# Patient Record
Sex: MALE | Race: ASIAN
[De-identification: names, ages, dates, MRNs, and addresses within clinical notes are randomized per-mention and may not be internally consistent; named-entity substitution may affect disease eponyms.]

---

## 2019-02-27 ENCOUNTER — HOSPITAL ENCOUNTER (EMERGENCY)
Dept: HOSPITAL 10 - E/R | Age: 44
LOS: 1 days | Discharge: HOME | End: 2019-02-28
Payer: COMMERCIAL

## 2019-02-27 ENCOUNTER — HOSPITAL ENCOUNTER (EMERGENCY)
Dept: HOSPITAL 91 - E/R | Age: 44
LOS: 1 days | Discharge: HOME | End: 2019-02-28
Payer: COMMERCIAL

## 2019-02-27 VITALS
WEIGHT: 170.35 LBS | BODY MASS INDEX: 25.23 KG/M2 | HEIGHT: 69 IN | HEIGHT: 69 IN | BODY MASS INDEX: 25.23 KG/M2 | WEIGHT: 170.35 LBS

## 2019-02-27 DIAGNOSIS — D64.9: ICD-10-CM

## 2019-02-27 DIAGNOSIS — L03.116: Primary | ICD-10-CM

## 2019-02-27 PROCEDURE — 73630 X-RAY EXAM OF FOOT: CPT

## 2019-02-27 PROCEDURE — 96375 TX/PRO/DX INJ NEW DRUG ADDON: CPT

## 2019-02-27 PROCEDURE — 85651 RBC SED RATE NONAUTOMATED: CPT

## 2019-02-27 PROCEDURE — 84560 ASSAY OF URINE/URIC ACID: CPT

## 2019-02-27 PROCEDURE — 99285 EMERGENCY DEPT VISIT HI MDM: CPT

## 2019-02-27 PROCEDURE — 96374 THER/PROPH/DIAG INJ IV PUSH: CPT

## 2019-02-27 PROCEDURE — 80048 BASIC METABOLIC PNL TOTAL CA: CPT

## 2019-02-27 PROCEDURE — 93971 EXTREMITY STUDY: CPT

## 2019-02-27 PROCEDURE — 85025 COMPLETE CBC W/AUTO DIFF WBC: CPT

## 2019-02-28 VITALS — RESPIRATION RATE: 16 BRPM | SYSTOLIC BLOOD PRESSURE: 133 MMHG | HEART RATE: 68 BPM | DIASTOLIC BLOOD PRESSURE: 87 MMHG

## 2019-02-28 LAB
ADD MAN DIFF?: NO
ANION GAP: 13 (ref 5–13)
BASOPHIL #: 0 10^3/UL (ref 0–0.1)
BASOPHILS %: 0.3 % (ref 0–2)
BLOOD UREA NITROGEN: 14 MG/DL (ref 7–20)
CALCIUM: 9.9 MG/DL (ref 8.4–10.2)
CARBON DIOXIDE: 23 MMOL/L (ref 21–31)
CHLORIDE: 104 MMOL/L (ref 97–110)
CREATININE: 1.22 MG/DL (ref 0.61–1.24)
EOSINOPHILS #: 0.4 10^3/UL (ref 0–0.5)
EOSINOPHILS %: 3.9 % (ref 0–7)
ERYTHROCYTE SEDIMENTATION RATE: 72 MM/HR (ref 0–15)
GLUCOSE: 102 MG/DL (ref 70–220)
HEMATOCRIT: 40.1 % (ref 42–52)
HEMOGLOBIN: 13.1 G/DL (ref 14–18)
IMMATURE GRANS #M: 0.06 10^3/UL (ref 0–0.03)
IMMATURE GRANS % (M): 0.6 % (ref 0–0.43)
LYMPHOCYTES #: 3.1 10^3/UL (ref 0.8–2.9)
LYMPHOCYTES %: 29.7 % (ref 15–51)
MEAN CORPUSCULAR HEMOGLOBIN: 32.7 PG (ref 29–33)
MEAN CORPUSCULAR HGB CONC: 32.7 G/DL (ref 32–37)
MEAN CORPUSCULAR VOLUME: 100 FL (ref 82–101)
MEAN PLATELET VOLUME: 9 FL (ref 7.4–10.4)
MONOCYTE #: 0.8 10^3/UL (ref 0.3–0.9)
MONOCYTES %: 7.5 % (ref 0–11)
NEUTROPHIL #: 6 10^3/UL (ref 1.6–7.5)
NEUTROPHILS %: 58 % (ref 39–77)
NUCLEATED RED BLOOD CELLS #: 0 10^3/UL (ref 0–0)
NUCLEATED RED BLOOD CELLS%: 0 /100WBC (ref 0–0)
PLATELET COUNT: 249 10^3/UL (ref 140–415)
POTASSIUM: 4 MMOL/L (ref 3.5–5.1)
RED BLOOD COUNT: 4.01 10^6/UL (ref 4.7–6.1)
RED CELL DISTRIBUTION WIDTH: 11.5 % (ref 11.5–14.5)
SODIUM: 140 MMOL/L (ref 135–144)
URIC ACID: 7.8 MG/DL (ref 3.1–7.9)
WHITE BLOOD COUNT: 10.3 10^3/UL (ref 4.8–10.8)

## 2019-02-28 RX ADMIN — KETOROLAC TROMETHAMINE 1 MG: 30 INJECTION, SOLUTION INTRAMUSCULAR at 00:35

## 2019-02-28 RX ADMIN — CEFAZOLIN 1 MLS/HR: 1 INJECTION, POWDER, FOR SOLUTION INTRAMUSCULAR; INTRAVENOUS at 02:31

## 2019-02-28 NOTE — ERD
ER Documentation


Chief Complaint


Chief Complaint





LEFT FOOT X 2 WEEKS





HPI


The patient is a 44-year-old male, presenting to the ER because of acute left 


foot pain/swelling intermittently for the last 2 weeks, worse today.  He denies 


fever, chills, neck pain, chest pain, dyspnea, abdominal pain, vomiting, 


dysuria, diarrhea.  He smokes and drinks socially, denies any recent of 


traveling





Medical history: Gout


Past surgical history: Appendectomy





ROS


All systems reviewed and are negative except as per history of present illness.





Medications


Home Meds


Active Scripts


Ibuprofen* (Motrin*) 600 Mg Tab, 600 MG PO Q6H PRN for PAIN AND OR ELEVATED 


TEMP, #30 TAB


   Prov:ROSEMARY YUSUF MD         19


Hydrocodone/Acetaminophen (Norco 5-325 Tablet) 1 Each Tablet, 1 TAB PO Q6H PRN 


for PAIN, #7 TAB


   Prov:ROSEMARY YUSUF MD         19


Cephalexin* (Keflex*) 500 Mg Capsule, 500 MG PO QID for 10 Days, CAP


   Prov:ROSEMARY YUSUF MD         19





Allergies


Allergies:  


Coded Allergies:  


     No Known Allergy (Unverified , 19)





Physical Exam


Vitals





Vital Signs


  Date      Temp  Pulse  Resp  B/P (MAP)   Pulse Ox  O2          O2 Flow    FiO2


Time                                                 Delivery    Rate


   19           68    16      133/87        98  Room Air


     00:55                          (102)


   19  98.1    105    18      160/93        97


     20:40                          (115)





Physical Exam


 Const:      No acute distress.


 Head:        Atraumatic.


 Eyes:       Normal Conjunctiva.


 ENT:         Normal External Ears, Nose and Mouth.


 Neck:        Full range of motion.  No meningismus.


 Resp:         Clear to auscultation bilaterally.


 Cardio:       Regular rate and rhythm.


 Abd:         Soft,  non distended, normal bowel sounds, non tender.


 Skin:         No petechiae or rashes.


 Back:        No midline or flank tenderness.


 Ext:          Left foot is edematous, erythematous, warm to touch, vague left 


calf tenderness, minimal discomfort at the first metatarsophalangeal joint


 Neur:        Awake and alert. No focal deficit


 Psych:        Normal Mood and Affect.


Result Diagram:  


190                                                                    


           19 003





Results 24 hrs





Laboratory Tests


              Test
                                 19
00:30


              White Blood Count                     10.3 10^3/ul


              Red Blood Count                       4.01 10^6/ul


              Hemoglobin                               13.1 g/dl


              Hematocrit                                  40.1 %


              Mean Corpuscular Volume                   100.0 fl


              Mean Corpuscular Hemoglobin                32.7 pg


              Mean Corpuscular Hemoglobin
Concent     32.7 g/dl 



              Red Cell Distribution Width                 11.5 %


              Platelet Count                         249 10^3/UL


              Mean Platelet Volume                        9.0 fl


              Immature Granulocytes %                    0.600 %


              Neutrophils %                               58.0 %


              Lymphocytes %                               29.7 %


              Monocytes %                                  7.5 %


              Eosinophils %                                3.9 %


              Basophils %                                  0.3 %


              Nucleated Red Blood Cells %            0.0 /100WBC


              Immature Granulocytes #              0.060 10^3/ul


              Neutrophils #                          6.0 10^3/ul


              Lymphocytes #                          3.1 10^3/ul


              Monocytes #                            0.8 10^3/ul


              Eosinophils #                          0.4 10^3/ul


              Basophils #                            0.0 10^3/ul


              Nucleated Red Blood Cells #            0.0 10^3/ul


              Erythrocyte Sedimentation Rate            72 mm/Hr


              Sodium Level                            140 mmol/L


              Potassium Level                         4.0 mmol/L


              Chloride Level                          104 mmol/L


              Carbon Dioxide Level                     23 mmol/L


              Anion Gap                                       13


              Blood Urea Nitrogen                       14 mg/dl


              Creatinine                              1.22 mg/dl


              Est Glomerular Filtrat Rate
mL/min   > 60 mL/min 



              Glucose Level                            102 mg/dl


              Uric Acid                                7.8 mg/dl


              Calcium Level                            9.9 mg/dl





Current Medications


 Medications
   Dose
          Sig/Lynn
       Start Time
   Status  Last


 (Trade)       Ordered        Route
 PRN     Stop Time              Admin
Dose


                              Reason                                Admin


 Ketorolac
     30 mg          ONCE  STAT
    19       DC           19


Tromethamine
                 IV
            00:13
                       00:35



 (Toradol)                                   19 00:19


 Calcium        1,000 mg       ONCE  ONCE
    19       Cancel   



Chloride
                     IV
            01:30



(Ca Chloride                                 19 01:31


10%
 Syg)


 Cefazolin      50 ml @ 
      ONCE
 IVPB
    19                



Sodium         100 mls/hr                    02:30



                                             19 02:59








Procedures/[place] [address]


                        Radiology Main Line: [phone]





                            DIAGNOSTIC IMAGING REPORT





  Patient: PATY MARTINEZ   : 1975   Age: 44  Sex: M                        


       MR #:    [id number]   Acct #:   [id number]    DOS: 19 0013


Ordering MD: ROSEMARY YUSUF MD   Location:  E/R   Room/Bed:                    


                       


                                        


PROCEDURE:   US left Lower extremity Venous. 


 


CLINICAL INDICATION: Left lower extremity pain.


 


TECHNIQUE:   Multiple sonographic images of the left lower extremity deep venous


system was obtained utilizing gray scale, color-flow, compressive sonography and


doppler imaging with augmentation.  The images were reviewed on a PACS 


workstation. 


 


COMPARISON:   None. 


 


FINDINGS:


There is normal compressibility and flow within the left common femoral, deep 


femoral, superficial femoral, posterior tibial, peroneal and popliteal veins.  


 


IMPRESSION:


No sonographic evidence for left lower extremity deep venous thrombosis. 


 


RPTAT: UU


_____________________________________________ 


CHRISTINA Chavez Physician           Date    Time 


Electronically viewed and signed by Physician Rachna on 2019 01:53 


 


D:  2019 01:53  T:  2019 01:53


RS/





CC: ROSEMARY YUSUF MD





[id number]


                          [place] [address]


                        Radiology Main Line: [phone]





                            DIAGNOSTIC IMAGING REPORT





  Patient: PATY MARTINEZ   : 1975   Age: 44  Sex: M                        


       MR #:    [id number]   Acct #:   [id number]    DOS: 19 0013


Ordering MD: ROSEMARY YUSUF MD   Location:  E/R   Room/Bed:                    


                       


                                        


PROCEDURE:  X-ray left foot.


 


CLINICAL INDICATION:   Pain and swelling in the left foot. Reference markers are


directed towards the lateral and dorsum of the midfoot.


 


TECHNIQUE:   AP, lateral and oblique views of the left foot. 


 


COMPARISON:   None. 


 


FINDINGS:


No acute fracture or dislocation. Small plantar and posterior dorsal calcaneal 


enthesophytes. Mild soft tissue swelling over the dorsum of the left foot. 


Otherwise, the soft tissues are unremarkable. 


 


IMPRESSION:


No acute fracture.


 


RPTAT: UU


_____________________________________________ 


R  Stecher, Physician           Date    Time 


Electronically viewed and signed by Physician Rachna on 2019 01:52 


 


D:  2019 01:52  T:  2019 01:52


RS/





CC: ROSEMARY YUSUF MD





[id number]





MEDICAL MAKING DECISION: The patient is a 44-year-old male, presenting with 


acute left foot cellulitis, suspected with acute gout flareup.  He was treated 


with Toradol 30 mg IV for pain, Ancef 1 g IV for cellulitis with good response, 


is above outpatient follow-up





The differential diagnoses considered include but are not limited to cellulitis,


abscess, acute gout, DVT, lymphedema





Departure


Diagnosis:  


   Primary Impression:  


   Cellulitis


   Additional Impression:  


   Anemia


Condition:  Good


Comments


He was discharged with Keflex, 7 tablets of Norco 5 mg, Motrin





The patient's blood pressure was elevated (>120/80) but appears stable without 


evidence of hypertension emergency or urgency.  The patient was counseled about 


the risks of hypertension and urged to pursue outpatient monitoring and therapy 


within a week with their primary care physician.





I discussed the findings with the patient. I advised the patient to follow-up 


with the primary physician in about 2-3 days, sooner if needed and return if any


concern.





Disclaimer: Inadvertent spelling and grammatical errors are likely due to [employer]


R/dictation software use and do not reflect on the overall quality of patient 


care. Also, please note that the electronic time recorded on this note does not 


necessarily reflect the actual time of the patient encounter.











ROSEMARY YUSUF MD              2019 00:05